# Patient Record
Sex: MALE | Race: WHITE | URBAN - METROPOLITAN AREA
[De-identification: names, ages, dates, MRNs, and addresses within clinical notes are randomized per-mention and may not be internally consistent; named-entity substitution may affect disease eponyms.]

---

## 2018-05-28 ENCOUNTER — COMMUNICATION - HEALTHEAST (OUTPATIENT)
Dept: SCHEDULING | Facility: CLINIC | Age: 43
End: 2018-05-28

## 2019-10-08 ENCOUNTER — RECORDS - HEALTHEAST (OUTPATIENT)
Dept: LAB | Facility: CLINIC | Age: 44
End: 2019-10-08

## 2019-10-08 LAB
ALBUMIN SERPL-MCNC: 4.3 G/DL (ref 3.5–5)
ALP SERPL-CCNC: 40 U/L (ref 45–120)
ALT SERPL W P-5'-P-CCNC: 26 U/L (ref 0–45)
ANION GAP SERPL CALCULATED.3IONS-SCNC: 7 MMOL/L (ref 5–18)
AST SERPL W P-5'-P-CCNC: 20 U/L (ref 0–40)
BILIRUB SERPL-MCNC: 1 MG/DL (ref 0–1)
BUN SERPL-MCNC: 8 MG/DL (ref 8–22)
CALCIUM SERPL-MCNC: 10.3 MG/DL (ref 8.5–10.5)
CHLORIDE BLD-SCNC: 102 MMOL/L (ref 98–107)
CO2 SERPL-SCNC: 30 MMOL/L (ref 22–31)
CREAT SERPL-MCNC: 1.01 MG/DL (ref 0.7–1.3)
GFR SERPL CREATININE-BSD FRML MDRD: >60 ML/MIN/1.73M2
GLUCOSE BLD-MCNC: 109 MG/DL (ref 70–125)
LIPASE SERPL-CCNC: 41 U/L (ref 0–52)
POTASSIUM BLD-SCNC: 4.6 MMOL/L (ref 3.5–5)
PROT SERPL-MCNC: 7.5 G/DL (ref 6–8)
SODIUM SERPL-SCNC: 139 MMOL/L (ref 136–145)

## 2019-11-15 ENCOUNTER — RECORDS - HEALTHEAST (OUTPATIENT)
Dept: LAB | Facility: CLINIC | Age: 44
End: 2019-11-15

## 2019-11-15 LAB
ANION GAP SERPL CALCULATED.3IONS-SCNC: 10 MMOL/L (ref 5–18)
BUN SERPL-MCNC: 18 MG/DL (ref 8–22)
CALCIUM SERPL-MCNC: 10.1 MG/DL (ref 8.5–10.5)
CHLORIDE BLD-SCNC: 106 MMOL/L (ref 98–107)
CO2 SERPL-SCNC: 26 MMOL/L (ref 22–31)
CREAT SERPL-MCNC: 0.84 MG/DL (ref 0.7–1.3)
GFR SERPL CREATININE-BSD FRML MDRD: >60 ML/MIN/1.73M2
GLUCOSE BLD-MCNC: 101 MG/DL (ref 70–125)
MAGNESIUM SERPL-MCNC: 1.9 MG/DL (ref 1.8–2.6)
PHOSPHATE SERPL-MCNC: 3.4 MG/DL (ref 2.5–4.5)
POTASSIUM BLD-SCNC: 4.4 MMOL/L (ref 3.5–5)
SODIUM SERPL-SCNC: 142 MMOL/L (ref 136–145)
TSH SERPL DL<=0.005 MIU/L-ACNC: 0.57 UIU/ML (ref 0.3–5)

## 2019-12-06 ENCOUNTER — AMBULATORY - HEALTHEAST (OUTPATIENT)
Dept: CARDIOLOGY | Facility: CLINIC | Age: 44
End: 2019-12-06

## 2019-12-06 ENCOUNTER — RECORDS - HEALTHEAST (OUTPATIENT)
Dept: ADMINISTRATIVE | Facility: OTHER | Age: 44
End: 2019-12-06

## 2019-12-09 ENCOUNTER — OFFICE VISIT - HEALTHEAST (OUTPATIENT)
Dept: CARDIOLOGY | Facility: CLINIC | Age: 44
End: 2019-12-09

## 2019-12-09 DIAGNOSIS — F10.10 ALCOHOL ABUSE, EPISODIC DRINKING BEHAVIOR: ICD-10-CM

## 2019-12-09 DIAGNOSIS — I49.3 PVC'S (PREMATURE VENTRICULAR CONTRACTIONS): ICD-10-CM

## 2019-12-09 ASSESSMENT — MIFFLIN-ST. JEOR: SCORE: 1808.44

## 2019-12-17 ENCOUNTER — HOSPITAL ENCOUNTER (OUTPATIENT)
Dept: CARDIOLOGY | Facility: HOSPITAL | Age: 44
Discharge: HOME | End: 2019-12-17
Attending: INTERNAL MEDICINE

## 2019-12-17 DIAGNOSIS — I49.3 PVC'S (PREMATURE VENTRICULAR CONTRACTIONS): ICD-10-CM

## 2019-12-17 LAB
DOP CALC LVOT PEAK VEL: 73.9 CM/S
DOP CALCLVOT PEAK VEL VTI: 15.7 CM
EJECTION FRACTION: 43 % (ref 55–75)
LEFT VENTRICLE DIASTOLIC VOLUME: 106 CM3 (ref 62–150)
LEFT VENTRICLE SYSTOLIC VOLUME: 60 CM3 (ref 21–61)
LEFT VENTRICULAR OUTFLOW TRACT MEAN GRADIENT: 1 MMHG
LEFT VENTRICULAR OUTFLOW TRACT MEAN VELOCITY: 50.3 CM/S
LEFT VENTRICULAR OUTFLOW TRACT PEAK GRADIENT: 2 MMHG
MITRAL VALVE DECELERATION SLOPE: 4020 MM/S2
MITRAL VALVE E/A RATIO: 1.1
MITRAL VALVE PRESSURE HALF-TIME: 63 MS
MV DECELERATION TIME: 215 MS
MV PEAK A VELOCITY: 81.5 CM/S
MV PEAK E VELOCITY: 86.3 CM/S
MV VALVE AREA PRESSURE 1/2 METHOD: 3.5 CM2
STRESS ECHO TARGET HR: 178
TRICUSPID REGURGITATION PEAK PRESSURE GRADIENT: 14.9 MMHG
TRICUSPID VALVE PEAK REGURGITANT VELOCITY: 193 CM/S

## 2019-12-18 ENCOUNTER — HOSPITAL ENCOUNTER (OUTPATIENT)
Dept: CARDIOLOGY | Facility: CLINIC | Age: 44
Discharge: HOME | End: 2019-12-18
Attending: INTERNAL MEDICINE

## 2019-12-18 ENCOUNTER — HOSPITAL ENCOUNTER (OUTPATIENT)
Dept: NUCLEAR MEDICINE | Facility: CLINIC | Age: 44
Discharge: HOME | End: 2019-12-18
Attending: INTERNAL MEDICINE

## 2019-12-18 DIAGNOSIS — I49.3 PVC'S (PREMATURE VENTRICULAR CONTRACTIONS): ICD-10-CM

## 2019-12-18 LAB
CV STRESS CURRENT BP HE: NORMAL
CV STRESS CURRENT HR HE: 100
CV STRESS CURRENT HR HE: 105
CV STRESS CURRENT HR HE: 105
CV STRESS CURRENT HR HE: 106
CV STRESS CURRENT HR HE: 106
CV STRESS CURRENT HR HE: 108
CV STRESS CURRENT HR HE: 121
CV STRESS CURRENT HR HE: 122
CV STRESS CURRENT HR HE: 125
CV STRESS CURRENT HR HE: 125
CV STRESS CURRENT HR HE: 130
CV STRESS CURRENT HR HE: 130
CV STRESS CURRENT HR HE: 70
CV STRESS CURRENT HR HE: 74
CV STRESS CURRENT HR HE: 83
CV STRESS CURRENT HR HE: 88
CV STRESS CURRENT HR HE: 96
CV STRESS DEVIATION TIME HE: NORMAL
CV STRESS ECHO PERCENT HR HE: NORMAL
CV STRESS EXERCISE STAGE HE: NORMAL
CV STRESS FINAL RESTING BP HE: NORMAL
CV STRESS FINAL RESTING HR HE: 96
CV STRESS MAX HR HE: 132
CV STRESS MAX TREADMILL GRADE HE: 0
CV STRESS MAX TREADMILL SPEED HE: 0
CV STRESS PEAK DIA BP HE: NORMAL
CV STRESS PEAK SYS BP HE: NORMAL
CV STRESS PHASE HE: NORMAL
CV STRESS PROTOCOL HE: NORMAL
CV STRESS RESTING PT POSITION HE: NORMAL
CV STRESS RESTING PT POSITION HE: NORMAL
CV STRESS ST DEVIATION AMOUNT HE: NORMAL
CV STRESS ST DEVIATION ELEVATION HE: NORMAL
CV STRESS ST EVELATION AMOUNT HE: NORMAL
CV STRESS TEST TYPE HE: NORMAL
CV STRESS TOTAL STAGE TIME MIN 1 HE: NORMAL
RATE PRESSURE PRODUCT: NORMAL
STRESS ECHO BASELINE DIASTOLIC HE: 78
STRESS ECHO BASELINE SYSTOLIC BP: 124
STRESS ECHO CALCULATED PERCENT HR: 74 %
STRESS ECHO LAST STRESS DIASTOLIC BP: 75
STRESS ECHO LAST STRESS HR: 121
STRESS ECHO LAST STRESS SYSTOLIC BP: 138
STRESS ECHO TARGET HR: 178

## 2019-12-18 RX ORDER — SILDENAFIL CITRATE 20 MG/1
TABLET ORAL
Status: SHIPPED | COMMUNITY
Start: 2018-12-24

## 2020-04-02 ENCOUNTER — COMMUNICATION - HEALTHEAST (OUTPATIENT)
Dept: SCHEDULING | Facility: CLINIC | Age: 45
End: 2020-04-02

## 2020-04-02 ENCOUNTER — COMMUNICATION - HEALTHEAST (OUTPATIENT)
Dept: CARDIOLOGY | Facility: CLINIC | Age: 45
End: 2020-04-02

## 2020-08-31 ENCOUNTER — RECORDS - HEALTHEAST (OUTPATIENT)
Dept: LAB | Facility: CLINIC | Age: 45
End: 2020-08-31

## 2020-08-31 LAB
ALBUMIN SERPL-MCNC: 4.1 G/DL (ref 3.5–5)
ALP SERPL-CCNC: 37 U/L (ref 45–120)
ALT SERPL W P-5'-P-CCNC: 30 U/L (ref 0–45)
ANION GAP SERPL CALCULATED.3IONS-SCNC: 11 MMOL/L (ref 5–18)
AST SERPL W P-5'-P-CCNC: 25 U/L (ref 0–40)
BILIRUB SERPL-MCNC: 0.7 MG/DL (ref 0–1)
BUN SERPL-MCNC: 10 MG/DL (ref 8–22)
CALCIUM SERPL-MCNC: 9.3 MG/DL (ref 8.5–10.5)
CHLORIDE BLD-SCNC: 105 MMOL/L (ref 98–107)
CO2 SERPL-SCNC: 24 MMOL/L (ref 22–31)
CREAT SERPL-MCNC: 0.8 MG/DL (ref 0.7–1.3)
GFR SERPL CREATININE-BSD FRML MDRD: >60 ML/MIN/1.73M2
GLUCOSE BLD-MCNC: 111 MG/DL (ref 70–125)
MAGNESIUM SERPL-MCNC: 1.7 MG/DL (ref 1.8–2.6)
POTASSIUM BLD-SCNC: 3.7 MMOL/L (ref 3.5–5)
PROT SERPL-MCNC: 7.1 G/DL (ref 6–8)
SODIUM SERPL-SCNC: 140 MMOL/L (ref 136–145)
TSH SERPL DL<=0.005 MIU/L-ACNC: 0.76 UIU/ML (ref 0.3–5)
VIT B12 SERPL-MCNC: 637 PG/ML (ref 213–816)

## 2021-06-03 VITALS
RESPIRATION RATE: 14 BRPM | DIASTOLIC BLOOD PRESSURE: 78 MMHG | BODY MASS INDEX: 26.14 KG/M2 | HEIGHT: 72 IN | SYSTOLIC BLOOD PRESSURE: 110 MMHG | WEIGHT: 193 LBS | HEART RATE: 80 BPM

## 2021-06-04 NOTE — PROGRESS NOTES
Patient arrived for stress echo.  Resting echo showed decreased EF of ~40%. Consulted with Dr. Turk on whether to continue with test.  Dr. Turk switched patient to a limited echo and a Lexiscan nuclear test.  Nuclear test scheduled for future.

## 2021-06-07 NOTE — TELEPHONE ENCOUNTER
----- Message from Leatha Hillman sent at 4/2/2020 12:46 PM CDT -----      Caller: Patient  Primary cardiologist: Dr. Turk  Detailed reason for call: Patient had some questions about test and procedure results. Please advise    Best phone number: [phone]  Best time to contact: today  Ok to leave a detailed message? yes  Device? No

## 2021-06-07 NOTE — TELEPHONE ENCOUNTER
I agree with your recommendations regarding EtOH abstinence and attempt at stress reduction. If symptoms do not improve with above measures, we could try him on low dose beta blocker such as metoprolol succinate 12.5 mg daily although I am concerned he may have more side effects from the medication than benefit.    KML

## 2021-06-07 NOTE — TELEPHONE ENCOUNTER
Rec'd return call from patient - informed him of Dr. Turk's response/recommendations - patient verbalized understanding of importance of lifestyle changes and offered no further questions/concerns at this time.  mg

## 2021-06-07 NOTE — TELEPHONE ENCOUNTER
"Copied from 12-18-19 Lexiscan nuc results note:   \"Notes recorded by Angle Turk MD on 12/20/2019 at 3:58 PM CST   Let him know that the nuclear stress study demonstrates no evidence of ischemia or previous heart attack.  There is slight reduction in his overall heart function at 50%.  Tell him this could be related to heavy alcohol use.  PVCs likely related to his alcohol use as well.  I would recommend cessation of alcohol at this point.  KML\"    \"Notes recorded by Onelia Norris RN on 12/23/2019 at 9:51 AM CST   Return call to patient - informed him of stable results and Dr. Turk's response/recommendations - patient verbalized understanding, offered no questions/concerns and agreed to f/u as needed.  mg\"    Return call to patient stated he has noted an increase in PVC's over the past 6-7wks which started about 5 days after having a colonoscopy - patient questioned if they could be related and whether there was anything found with stress test that could be cause.    Reviewed again with patient nuc results and Dr. Turk's response/recommendations and reassured him that results did not show signs of blood flow issues or scarring which could be causing PVC's.    Patient reported decrease in episodes of heavy ETOH consumption but not abstinence and increased work stress - patient stated PVC's occur daily while at work and are not accompanied by dizziness, CP, dyspnea, or problems sleeping.    Explained to patient importance of ETOH abstinence and reducing stress to better control PVC's and reassured patient that update would be forwarded to Dr. Turk for any new recommendations - patient verbalized understanding and agreed with plan.    Please review patient update - any new orders?  mg  "

## 2021-06-28 NOTE — PROGRESS NOTES
Progress Notes by Angle Turk MD at 12/9/2019  9:10 AM     Author: Angle Turk MD Service: -- Author Type: Physician    Filed: 12/9/2019 10:02 AM Encounter Date: 12/9/2019 Status: Signed    : Angle Turk MD (Physician)           Thank you, Dr. Robert ref. provider found, for asking the [facility] team to see Mr. Dino Davenport to evaluate PVCs.    Assessment/Recommendations   Assessment:    1.  PVCs, likely precipitated by a binge alcohol use and emotional stress as he admits to difficulties with his marriage.  No symptoms of exertional chest discomfort or dyspnea, no orthopnea, PND or lower extremity edema to suggest left ventricular dysfunction although would recommend echocardiogram to more fully evaluate as he could have some mild degree of LV dysfunction which may not be clinically evident.  I did recommend a stress echo study as this will also allow me to see whether PVCs become suppressed at higher heart rates.  He is agreeable and we will arrange this.  2.  Binge alcohol use.  He states this is been a 20-year problem.  We did talk about the importance of alcohol cessation.  I did bring up the option of chemical dependency treatment if he is unable to do it on his own.    Plan:  1.  Set up stress echo study with further recommendations to follow  2.  Work on alcohol cessation       History of Present Illness    Mr. Dino Davenport is a 44 y.o. male with history of alcohol abuse for the past 20 years and significant emotional stress due to marriage difficulties who presents today for evaluation of PVCs noted on a Holter monitor.  Over the last month, he has noted intermittent palpitations which appear to correlate with his binge drinking.  When they occur, he will stop exercising and cut back on his alcohol intake.  PVCs will stop and he will begin drinking again and exercising with recurrence of the PVCs after a few days.  Because of these palpitations, a Holter monitor  was obtained demonstrating a moderate number of PVCs but no evidence of ventricular tachycardia.  He is now referred here for further evaluation.  No family history of early coronary artery disease or sudden cardiac death.    ECG (personally reviewed): ECG from primary clinic demonstrates normal sinus rhythm without acute ST or T wave abnormalities.    Cardiac Imaging Studies (personally reviewed): No previous imaging     Physical Examination Review of Systems   Vitals:    12/09/19 0932   BP: 110/78   Pulse: 80   Resp: 14     Body mass index is 26.18 kg/m .  Wt Readings from Last 3 Encounters:   12/09/19 193 lb (87.5 kg)     General Appearance:   Awake, Alert, No acute distress.   HEENT:  No scleral icterus; the mucous membranes were pink and moist.   Neck: No cervical bruits or jugular venous distention    Chest: The spine was straight. The chest was symmetric.   Lungs:   Respirations unlabored; the lungs are clear to auscultation. No wheezing   Cardiovascular:    Regular rate and rhythm.  S1, S2 normal.  No murmur or gallop   Abdomen:  No organomegaly, masses, bruits, or tenderness. Bowels sounds are present   Extremities:  No peripheral edema, clubbing or cyanosis.   Skin: No xanthelasma. Warm, Dry.   Musculoskeletal: No tenderness.   Neurologic: Mood and affect are appropriate.    General: WNL  Eyes: WNL  Ears/Nose/Throat: WNL  Lungs: WNL  Heart: WNL  Stomach: WNL  Bladder: WNL  Muscle/Joints: WNL  Skin: WNL  Nervous System: WNL  Mental Health: WNL     Blood: WNL     Medical History  Surgical History Family History Social History   Past Medical History:   Diagnosis Date   ? Arrhythmia 12/2019    PVC's    No past surgical history on file. Family History   Problem Relation Age of Onset   ? ALS Father     Social History     Socioeconomic History   ? Marital status:      Spouse name: Not on file   ? Number of children: Not on file   ? Years of education: Not on file   ? Highest education level: Not on file    Occupational History   ? Not on file   Social Needs   ? Financial resource strain: Not on file   ? Food insecurity:     Worry: Not on file     Inability: Not on file   ? Transportation needs:     Medical: Not on file     Non-medical: Not on file   Tobacco Use   ? Smoking status: Never Smoker   ? Smokeless tobacco: Former User     Types: Chew   Substance and Sexual Activity   ? Alcohol use: Yes     Alcohol/week: 12.0 standard drinks     Types: 12 Cans of beer per week     Comment: heavy alcohol   ? Drug use: Never   ? Sexual activity: Not on file   Lifestyle   ? Physical activity:     Days per week: Not on file     Minutes per session: Not on file   ? Stress: Not on file   Relationships   ? Social connections:     Talks on phone: Not on file     Gets together: Not on file     Attends Scient[employer] service: Not on file     Active member of club or organization: Not on file     Attends meetings of clubs or organizations: Not on file     Relationship status: Not on file   ? Intimate partner violence:     Fear of current or ex partner: Not on file     Emotionally abused: Not on file     Physically abused: Not on file     Forced sexual activity: Not on file   Other Topics Concern   ? Not on file   Social History Narrative   ? Not on file          Medications  Allergies   No current outpatient medications on file.     No current facility-administered medications for this visit.       No Known Allergies      Lab Results    Chemistry/lipid CBC Cardiac Enzymes/BNP/TSH/INR   Lab Results   Component Value Date    CREATININE 0.84 11/15/2019    BUN 18 11/15/2019    K 4.4 11/15/2019     11/15/2019     11/15/2019    CO2 26 11/15/2019    No results found for: WBC, HGB, HCT, MCV, PLT Lab Results   Component Value Date    TSH 0.57 11/15/2019

## 2021-10-18 ENCOUNTER — LAB REQUISITION (OUTPATIENT)
Dept: LAB | Facility: CLINIC | Age: 46
End: 2021-10-18
Payer: COMMERCIAL

## 2021-10-18 DIAGNOSIS — Z01.818 ENCOUNTER FOR OTHER PREPROCEDURAL EXAMINATION: ICD-10-CM

## 2021-10-18 PROCEDURE — U0005 INFEC AGEN DETEC AMPLI PROBE: HCPCS | Mod: ORL | Performed by: NURSE PRACTITIONER

## 2021-10-19 LAB — SARS-COV-2 RNA RESP QL NAA+PROBE: NEGATIVE

## 2025-02-26 ENCOUNTER — LAB REQUISITION (OUTPATIENT)
Dept: LAB | Facility: CLINIC | Age: 50
End: 2025-02-26
Payer: COMMERCIAL

## 2025-02-26 DIAGNOSIS — R03.0 ELEVATED BLOOD-PRESSURE READING, WITHOUT DIAGNOSIS OF HYPERTENSION: ICD-10-CM

## 2025-02-26 DIAGNOSIS — Z13.220 ENCOUNTER FOR SCREENING FOR LIPOID DISORDERS: ICD-10-CM

## 2025-02-26 PROCEDURE — 80061 LIPID PANEL: CPT | Mod: ORL

## 2025-02-26 PROCEDURE — 80053 COMPREHEN METABOLIC PANEL: CPT | Mod: ORL

## 2025-02-27 LAB
ALBUMIN SERPL BCG-MCNC: 4.4 G/DL (ref 3.5–5.2)
ALP SERPL-CCNC: 44 U/L (ref 40–150)
ALT SERPL W P-5'-P-CCNC: 30 U/L (ref 0–70)
ANION GAP SERPL CALCULATED.3IONS-SCNC: 13 MMOL/L (ref 7–15)
AST SERPL W P-5'-P-CCNC: 26 U/L (ref 0–45)
BILIRUB SERPL-MCNC: 0.5 MG/DL
BUN SERPL-MCNC: 13.3 MG/DL (ref 6–20)
CALCIUM SERPL-MCNC: 9.5 MG/DL (ref 8.8–10.4)
CHLORIDE SERPL-SCNC: 104 MMOL/L (ref 98–107)
CHOLEST SERPL-MCNC: 166 MG/DL
CREAT SERPL-MCNC: 0.88 MG/DL (ref 0.67–1.17)
EGFRCR SERPLBLD CKD-EPI 2021: >90 ML/MIN/1.73M2
FASTING STATUS PATIENT QL REPORTED: NO
FASTING STATUS PATIENT QL REPORTED: NO
GLUCOSE SERPL-MCNC: 81 MG/DL (ref 70–99)
HCO3 SERPL-SCNC: 22 MMOL/L (ref 22–29)
HDLC SERPL-MCNC: 74 MG/DL
LDLC SERPL CALC-MCNC: 81 MG/DL
NONHDLC SERPL-MCNC: 92 MG/DL
POTASSIUM SERPL-SCNC: 4.1 MMOL/L (ref 3.4–5.3)
PROT SERPL-MCNC: 7.1 G/DL (ref 6.4–8.3)
SODIUM SERPL-SCNC: 139 MMOL/L (ref 135–145)
TRIGL SERPL-MCNC: 55 MG/DL

## 2025-09-01 ENCOUNTER — PATIENT OUTREACH (OUTPATIENT)
Dept: CARE COORDINATION | Facility: CLINIC | Age: 50
End: 2025-09-01
Payer: COMMERCIAL

## 2025-09-03 ENCOUNTER — PATIENT OUTREACH (OUTPATIENT)
Dept: CARE COORDINATION | Facility: CLINIC | Age: 50
End: 2025-09-03
Payer: COMMERCIAL